# Patient Record
Sex: MALE | Race: WHITE
[De-identification: names, ages, dates, MRNs, and addresses within clinical notes are randomized per-mention and may not be internally consistent; named-entity substitution may affect disease eponyms.]

---

## 2017-04-16 ENCOUNTER — HOSPITAL ENCOUNTER (EMERGENCY)
Dept: HOSPITAL 17 - NEPD | Age: 37
LOS: 1 days | Discharge: PSYCHIATRIC OR SUBSTANCE USE FACILITY | End: 2017-04-17
Payer: MEDICAID

## 2017-04-16 VITALS
TEMPERATURE: 97.5 F | OXYGEN SATURATION: 97 % | SYSTOLIC BLOOD PRESSURE: 125 MMHG | DIASTOLIC BLOOD PRESSURE: 84 MMHG | RESPIRATION RATE: 16 BRPM | HEART RATE: 108 BPM

## 2017-04-16 VITALS — WEIGHT: 198.42 LBS | BODY MASS INDEX: 30.07 KG/M2 | HEIGHT: 68 IN

## 2017-04-16 DIAGNOSIS — R45.851: ICD-10-CM
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
## 2017-04-16 DIAGNOSIS — L03.116: Primary | ICD-10-CM

## 2017-04-16 DIAGNOSIS — Z72.0: ICD-10-CM

## 2017-04-16 PROCEDURE — 85025 COMPLETE CBC W/AUTO DIFF WBC: CPT

## 2017-04-16 PROCEDURE — 80307 DRUG TEST PRSMV CHEM ANLYZR: CPT

## 2017-04-16 PROCEDURE — 99284 EMERGENCY DEPT VISIT MOD MDM: CPT

## 2017-04-16 PROCEDURE — 73610 X-RAY EXAM OF ANKLE: CPT

## 2017-04-16 PROCEDURE — 80053 COMPREHEN METABOLIC PANEL: CPT

## 2017-04-16 NOTE — PD
HPI


Chief Complaint:  Back/ Neck Pain or Injury


Time Seen by Provider:  23:48


Travel History


International Travel<30 days:  No


Contact w/Intl Traveler<30days:  No


Traveled to known affect area:  No





History of Present Illness


HPI


36-year-old male history of seizure disorder presents for evaluation of lateral 

left ankle pain.  He reports that a few weeks ago he had a breakthrough seizure 

and hit his lateral left ankle against the carpet.  He is developed some 

redness of the skin as well as some pain to the lateral left ankle.  Pain is 

mild and aggravated by movement and palpation.  He also endorses chronic lower 

back pain.  Denies any acute process in regards to his chronic lower back pain.

  Currently uses over-the-counter Tylenol and Motrin for pain control.  Denies 

any bowel or bladder incontinence, saddle anesthesia, abdominal pain, flank pain

, dysuria.  No other complaints.





PFSH


Past Medical History


Arthritis:  Yes


Asthma:  No


Autoimmune Disease:  No


Blood Disorders:  No


Bipolar Disorder:  Yes


Anxiety:  Yes


Depression:  Yes


Heart Rhythm Problems:  No


Cancer:  No


Cardiovascular Problems:  Yes


High Cholesterol:  Yes


Chemotherapy:  No


Chest Pain:  No


Congestive Heart Failure:  No


COPD:  No


Cerebrovascular Accident:  No


Cystic Fibrosis:  Yes


Diabetes:  Yes


Diminished Hearing:  No


Endocrine:  Yes


GERD:  Yes


Genitourinary:  Yes


Headaches:  Yes


Hepatitis:  Yes (C)


Hiatal Hernia:  No


Hypertension:  Yes


Immune Disorder:  No


Implanted Vascular Access Dvce:  No


Kidney Stones:  No


Musculoskeletal:  Yes


Neurologic:  Yes


Psychiatric:  Yes


Reproductive:  No


Respiratory:  No


Immunizations Current:  Yes


Migraines:  Yes


Radiation Therapy:  No


Renal Failure:  Yes


Schizophrenia:  Yes


Seizures:  Yes


Sickle Cell Disease:  No


Sleep Apnea:  Yes


Thyroid Disease:  No


Ulcer:  No





Past Surgical History


Abdominal Surgery:  No


AICD:  No


Arteriovenous Shunt:  No


Cardiac Surgery:  No


Ear Surgery:  No


Endocrine Surgery:  No


Eye Surgery:  No


Genitourinary Surgery:  No


Gynecologic Surgery:  No


Insulin Pump:  No


Joint Replacement:  No


Oral Surgery:  Yes (wisdom teeth extraction)


Pacemaker:  No


Thoracic Surgery:  No


Other Surgery:  Yes (oral sx)





Social History


Alcohol Use:  Yes (occasionally)


Tobacco Use:  Yes (10 CIGARETTES)


Substance Use:  Yes ([facility])





Allergies-Medications


(Allergen,Severity, Reaction):  


Coded Allergies:  


     Haldol (Verified  Allergy, Severe, JAWS LOCK, 4/16/17)


     Penicillin (Verified  Allergy, Severe, 4/16/17)


Reported Meds & Prescriptions





Reported Meds & Active Scripts


Active


Clindamycin (Clindamycin HCl) 300 Mg Cap 300 Mg PO TID 7 Days


Lisinopril 20 Mg Tab 20 Mg PO DAILY


Keppra (Levetiracetam) 1,000 Mg Tab 1,000 Mg PO BID


Heartburn Relief 150 Maxi (Ranitidine HCl) 150 Mg Tab 150 Mg PO DAILY


Reported


Trazodone (Trazodone HCl) 300 Mg Tab 400 Mg PO HS


Seroquel (Quetiapine Fumarate) 400 Mg Tab 400 Mg PO HS


Prozac (Fluoxetine HCl) 40 Mg Cap 80 Cap PO DAILY








Review of Systems


Except as stated in HPI:  all other systems reviewed are Neg





Physical Exam


Narrative


GENERAL: Well-nourished male in no acute distress


SKIN: Warm and dry.  Mild redness to the lateral left ankle skin noted.


HEAD: Atraumatic. Normocephalic. 


EYES: Pupils equal and round. No scleral icterus. No injection or drainage. 


ENT: No nasal bleeding or discharge.  Mucous membranes pink and moist.


NECK: Trachea midline. No JVD. 


CARDIOVASCULAR: Regular rate and rhythm.  No murmur appreciated.


RESPIRATORY: No accessory muscle use. Clear to auscultation. Breath sounds 

equal bilaterally. 


GASTROINTESTINAL: Abdomen soft, non-tender, nondistended. Hepatic and splenic 

margins not palpable. 


MUSCULOSKELETAL: No obvious deformities.  Mild tenderness to palpation to the 

lateral left ankle.  No tenderness to palpation to the midline spine or CVA 

tenderness.


NEUROLOGICAL: Awake and alert. No obvious cranial nerve deficits.  Motor 

grossly within normal limits. Normal speech.





Data


Data


Last Documented VS





Vital Signs








  Date Time  Temp Pulse Resp B/P Pulse Ox O2 Delivery O2 Flow Rate FiO2


 


4/16/17 21:49 97.5 108 16 125/84 97 Room Air  








Orders





 Ankle, Complete ([id number]) (4/16/17 )


Complete Blood Count With Diff (4/17/17 00:21)


Comprehensive Metabolic Panel (4/17/17 00:21)


Psych Screen (4/17/17 00:21)


Drug Screen, Random Urine (4/17/17 00:21)


Alcohol (Ethanol) (4/17/17 00:21)


Levetiracetam (Keppra) (4/17/17 02:15)





Labs





 Laboratory Tests








Test 4/17/17





 00:45


 


White Blood Count 8.7 TH/MM3


 


Red Blood Count 3.88 MIL/MM3


 


Hemoglobin 12.2 GM/DL


 


Hematocrit 34.9 %


 


Mean Corpuscular Volume 89.9 FL


 


Mean Corpuscular Hemoglobin 31.5 PG


 


Mean Corpuscular Hemoglobin 35.1 %





Concent 


 


Red Cell Distribution Width 13.6 %


 


Platelet Count 173 TH/MM3


 


Mean Platelet Volume 9.7 FL


 


Neutrophils (%) (Auto) 55.1 %


 


Lymphocytes (%) (Auto) 27.9 %


 


Monocytes (%) (Auto) 12.3 %


 


Eosinophils (%) (Auto) 3.9 %


 


Basophils (%) (Auto) 0.8 %


 


Neutrophils # (Auto) 4.8 TH/MM3


 


Lymphocytes # (Auto) 2.4 TH/MM3


 


Monocytes # (Auto) 1.1 TH/MM3


 


Eosinophils # (Auto) 0.3 TH/MM3


 


Basophils # (Auto) 0.1 TH/MM3


 


CBC Comment DIFF FINAL 


 


Differential Comment  


 


Sodium Level 135 MEQ/L


 


Potassium Level 3.5 MEQ/L


 


Chloride Level 99 MEQ/L


 


Carbon Dioxide Level 29.0 MEQ/L


 


Anion Gap 7 MEQ/L


 


Blood Urea Nitrogen 3 MG/DL


 


Creatinine 0.83 MG/DL


 


Estimat Glomerular Filtration 104 ML/MIN





Rate 


 


Random Glucose 147 MG/DL


 


Calcium Level 9.0 MG/DL


 


Total Bilirubin 0.5 MG/DL


 


Aspartate Amino Transf 25 U/L





(AST/SGOT) 


 


Alanine Aminotransferase 32 U/L





(ALT/SGPT) 


 


Alkaline Phosphatase 52 U/L


 


Total Protein 6.9 GM/DL


 


Albumin 3.4 GM/DL


 


Urine Opiates Screen NEG 


 


Urine Barbiturates Screen NEG 


 


Urine Amphetamines Screen NEG 


 


Urine Benzodiazepines Screen NEG 


 


Urine Cocaine Screen NEG 


 


Urine Cannabinoids Screen POS 


 


Ethyl Alcohol Level LESS THAN 3





 MG/DL











MDM


Medical Decision Making


Medical Screen Exam Complete:  Yes


Emergency Medical Condition:  Yes


Medical Record Reviewed:  Yes


Differential Diagnosis


Ankle contusion, fibular fracture, lateral ankle sprain, cellulitis


Narrative Course


X-ray imaging of the left ankle is negative.  Upon reexamination the patient 

states that he has been feeling suicidal homicidal and revengeful recently.  He 

is requesting a psychiatric evaluation.


Mental health screening discussed with the patient. Psychiatric screen ordered.


The patient is medically cleared.  He was screen psychiatrically.  He was 

placed under Baker act because of his irrational behavior and irrational 

thought processes.  He is being given a dose of Keppra because he allegedly has 

been out of his Keppra for several days.  He is being given prescriptions for 

Keppra and clindamycin.  He is being transferred to [facility] for 

psychiatric evaluation.





Diagnosis





 Primary Impression:  


 Cellulitis of left ankle


 Additional Impression:  


 Suicidal ideation


Scripts


Levetiracetam (Keppra)1,000 Mg Tab1,000 Mg PO BID  30 Days  Ref 0


   Prov:Trina Narayan MD         4/17/17 


Clindamycin 300 Mg Yoj775 Mg PO TID  7 Days  Ref 0


   Prov:Trina Narayan MD         4/17/17








Jamal Somers Apr 16, 2017 23:59

## 2017-04-17 LAB
ALP SERPL-CCNC: 52 U/L (ref 45–117)
ALT SERPL-CCNC: 32 U/L (ref 12–78)
AMPHETAMINE, URINE: (no result)
ANION GAP SERPL CALC-SCNC: 7 MEQ/L (ref 5–15)
AST SERPL-CCNC: 25 U/L (ref 15–37)
BARBITURATES, URINE: (no result)
BASOPHILS # BLD AUTO: 0.1 TH/MM3 (ref 0–0.2)
BASOPHILS NFR BLD: 0.8 % (ref 0–2)
BILIRUB SERPL-MCNC: 0.5 MG/DL (ref 0.2–1)
BUN SERPL-MCNC: 3 MG/DL (ref 7–18)
CHLORIDE SERPL-SCNC: 99 MEQ/L (ref 98–107)
COCAINE UR-MCNC: (no result) NG/ML
EOSINOPHIL # BLD: 0.3 TH/MM3 (ref 0–0.4)
EOSINOPHIL NFR BLD: 3.9 % (ref 0–4)
ERYTHROCYTE [DISTWIDTH] IN BLOOD BY AUTOMATED COUNT: 13.6 % (ref 11.6–17.2)
GFR SERPLBLD BASED ON 1.73 SQ M-ARVRAT: 104 ML/MIN (ref 89–?)
HCO3 BLD-SCNC: 29 MEQ/L (ref 21–32)
HCT VFR BLD CALC: 34.9 % (ref 39–51)
HEMO FLAGS: (no result)
LYMPHOCYTES # BLD AUTO: 2.4 TH/MM3 (ref 1–4.8)
LYMPHOCYTES NFR BLD AUTO: 27.9 % (ref 9–44)
MCH RBC QN AUTO: 31.5 PG (ref 27–34)
MCHC RBC AUTO-ENTMCNC: 35.1 % (ref 32–36)
MCV RBC AUTO: 89.9 FL (ref 80–100)
MONOCYTES NFR BLD: 12.3 % (ref 0–8)
NEUTROPHILS # BLD AUTO: 4.8 TH/MM3 (ref 1.8–7.7)
NEUTROPHILS NFR BLD AUTO: 55.1 % (ref 16–70)
PLATELET # BLD: 173 TH/MM3 (ref 150–450)
POTASSIUM SERPL-SCNC: 3.5 MEQ/L (ref 3.5–5.1)
RBC # BLD AUTO: 3.88 MIL/MM3 (ref 4.5–5.9)
SODIUM SERPL-SCNC: 135 MEQ/L (ref 136–145)
WBC # BLD AUTO: 8.7 TH/MM3 (ref 4–11)

## 2017-04-17 NOTE — RADRPT
EXAM DATE/TIME:  04/17/2017 00:20 

 

[facility] COMPARISON:     

No previous studies available for comparison.

 

                     

INDICATIONS :     

Left lateral ankle pain, fall off bed from seizure 1 week ago.

                     

 

MEDICAL HISTORY :     

None.          

 

SURGICAL HISTORY :     

None.   

 

ENCOUNTER:     

Initial                                        

 

ACUITY:     

1 day      

 

PAIN SCORE:     

4/10

 

LOCATION:     

Left lateral ankle.

 

FINDINGS:     

Three view exam was performed of the left ankle.  The bony structures are in normal alignment.  No ev
idence of fracture, dislocation, or soft tissue swelling.  The ankle mortise is intact.  No radiopaqu
e foreign bodies are seen.  Bony mineralization is normal.

 

CONCLUSION:     

Unremarkable examination of the left ankle.  

 

 

 

 Luis Wilkins MD on April 17, 2017 at 0:35           

Board Certified Radiologist.

 This report was verified electronically.

## 2017-08-15 ENCOUNTER — HOSPITAL ENCOUNTER (EMERGENCY)
Dept: HOSPITAL 17 - NEPC | Age: 37
Discharge: HOME | End: 2017-08-15
Payer: MEDICAID

## 2017-08-15 VITALS — HEIGHT: 69 IN | WEIGHT: 216.05 LBS | BODY MASS INDEX: 32 KG/M2

## 2017-08-15 VITALS
HEART RATE: 115 BPM | SYSTOLIC BLOOD PRESSURE: 132 MMHG | OXYGEN SATURATION: 95 % | RESPIRATION RATE: 18 BRPM | DIASTOLIC BLOOD PRESSURE: 84 MMHG

## 2017-08-15 VITALS
RESPIRATION RATE: 20 BRPM | OXYGEN SATURATION: 97 % | TEMPERATURE: 98.3 F | SYSTOLIC BLOOD PRESSURE: 135 MMHG | HEART RATE: 89 BPM | DIASTOLIC BLOOD PRESSURE: 86 MMHG

## 2017-08-15 DIAGNOSIS — K21.9: ICD-10-CM

## 2017-08-15 DIAGNOSIS — G47.33: ICD-10-CM

## 2017-08-15 DIAGNOSIS — R56.9: Primary | ICD-10-CM

## 2017-08-15 DIAGNOSIS — I10: ICD-10-CM

## 2017-08-15 LAB
ALP SERPL-CCNC: 78 U/L (ref 45–117)
ALT SERPL-CCNC: 78 U/L (ref 12–78)
ANION GAP SERPL CALC-SCNC: 4 MEQ/L (ref 5–15)
AST SERPL-CCNC: 59 U/L (ref 15–37)
BASOPHILS # BLD AUTO: 0.1 TH/MM3 (ref 0–0.2)
BASOPHILS NFR BLD: 0.6 % (ref 0–2)
BILIRUB SERPL-MCNC: 0.5 MG/DL (ref 0.2–1)
BUN SERPL-MCNC: 8 MG/DL (ref 7–18)
CHLORIDE SERPL-SCNC: 93 MEQ/L (ref 98–107)
EOSINOPHIL # BLD: 0.1 TH/MM3 (ref 0–0.4)
EOSINOPHIL NFR BLD: 1.2 % (ref 0–4)
ERYTHROCYTE [DISTWIDTH] IN BLOOD BY AUTOMATED COUNT: 13.2 % (ref 11.6–17.2)
GFR SERPLBLD BASED ON 1.73 SQ M-ARVRAT: 76 ML/MIN (ref 89–?)
HCO3 BLD-SCNC: 31.2 MEQ/L (ref 21–32)
HCT VFR BLD CALC: 46.2 % (ref 39–51)
HEMO FLAGS: (no result)
LYMPHOCYTES # BLD AUTO: 2.1 TH/MM3 (ref 1–4.8)
LYMPHOCYTES NFR BLD AUTO: 22 % (ref 9–44)
MCH RBC QN AUTO: 29.8 PG (ref 27–34)
MCHC RBC AUTO-ENTMCNC: 33.5 % (ref 32–36)
MCV RBC AUTO: 88.9 FL (ref 80–100)
MONOCYTES NFR BLD: 8.9 % (ref 0–8)
NEUTROPHILS # BLD AUTO: 6.6 TH/MM3 (ref 1.8–7.7)
NEUTROPHILS NFR BLD AUTO: 67.3 % (ref 16–70)
PLATELET # BLD: 229 TH/MM3 (ref 150–450)
POTASSIUM SERPL-SCNC: 4.6 MEQ/L (ref 3.5–5.1)
RBC # BLD AUTO: 5.19 MIL/MM3 (ref 4.5–5.9)
SODIUM SERPL-SCNC: 128 MEQ/L (ref 136–145)
WBC # BLD AUTO: 9.8 TH/MM3 (ref 4–11)

## 2017-08-15 PROCEDURE — 85025 COMPLETE CBC W/AUTO DIFF WBC: CPT

## 2017-08-15 PROCEDURE — 80053 COMPREHEN METABOLIC PANEL: CPT

## 2017-08-15 PROCEDURE — 93005 ELECTROCARDIOGRAM TRACING: CPT

## 2017-08-15 PROCEDURE — 96360 HYDRATION IV INFUSION INIT: CPT

## 2017-08-15 PROCEDURE — 99284 EMERGENCY DEPT VISIT MOD MDM: CPT

## 2017-08-15 NOTE — PD
Data


Data


Last Documented VS





Vital Signs








  Date Time  Temp Pulse Resp B/P Pulse Ox O2 Delivery O2 Flow Rate FiO2


 


8/15/17 11:29 98.3 86 20 135/86 97 Room Air  








Orders





 Complete Blood Count With Diff (8/15/17 11:18)


Electrocardiogram (8/15/17 )


Blood Glucose (8/15/17 11:18)


Ecg Monitoring (8/15/17 11:18)


Iv Access Insert/Monitor (8/15/17 11:18)


Oximetry (8/15/17 11:18)


Comprehensive Metabolic Panel (8/15/17 11:18)


Sodium Chloride 0.9% Flush (Ns Flush) (8/15/17 11:30)


Acetaminophen (Tylenol) (8/15/17 11:30)


Sodium Chlor 0.9% 1000 Ml Inj (Ns 1000 M (8/15/17 12:00)





Labs





 Laboratory Tests








Test 8/15/17





 11:30


 


White Blood Count 9.8 TH/MM3


 


Red Blood Count 5.19 MIL/MM3


 


Hemoglobin 15.5 GM/DL


 


Hematocrit 46.2 %


 


Mean Corpuscular Volume 88.9 FL


 


Mean Corpuscular Hemoglobin 29.8 PG


 


Mean Corpuscular Hemoglobin 33.5 %





Concent 


 


Red Cell Distribution Width 13.2 %


 


Platelet Count 229 TH/MM3


 


Mean Platelet Volume 9.4 FL


 


Neutrophils (%) (Auto) 67.3 %


 


Lymphocytes (%) (Auto) 22.0 %


 


Monocytes (%) (Auto) 8.9 %


 


Eosinophils (%) (Auto) 1.2 %


 


Basophils (%) (Auto) 0.6 %


 


Neutrophils # (Auto) 6.6 TH/MM3


 


Lymphocytes # (Auto) 2.1 TH/MM3


 


Monocytes # (Auto) 0.9 TH/MM3


 


Eosinophils # (Auto) 0.1 TH/MM3


 


Basophils # (Auto) 0.1 TH/MM3


 


CBC Comment DIFF FINAL 


 


Differential Comment  


 


Sodium Level 128 MEQ/L


 


Potassium Level 4.6 MEQ/L


 


Chloride Level 93 MEQ/L


 


Carbon Dioxide Level 31.2 MEQ/L


 


Anion Gap 4 MEQ/L


 


Blood Urea Nitrogen 8 MG/DL


 


Creatinine 1.09 MG/DL


 


Estimat Glomerular Filtration 76 ML/MIN





Rate 


 


Random Glucose 177 MG/DL


 


Calcium Level 9.8 MG/DL


 


Total Bilirubin 0.5 MG/DL


 


Aspartate Amino Transf 59 U/L





(AST/SGOT) 


 


Alanine Aminotransferase 78 U/L





(ALT/SGPT) 


 


Alkaline Phosphatase 78 U/L


 


Total Protein 8.4 GM/DL


 


Albumin 4.1 GM/DL











MDM


Supervised Visit with HERBIE:  Yes


Narrative Course


The history, exam, and medical decision-making in the associated mid-level 

provider note were completed with my assistance. I reviewed and agree with the 

findings presented.  I attest that I had a face-to-face encounter with the 

patient on the same day, and personally performed and documented my assessment 

and findings in the medical record. 





*My assessment and Findings:





36-year-old man with a seizure, history of seizures, stopped his Breath the 

direction of the psychiatrist.  Unclear why.  He states some lab by [facility] 

normal.  Nonetheless looks well now.  Labs unremarkable.  Recommend outpatient 

follow-up.


Diagnosis





 Primary Impression:  


 Breakthrough seizure


Referrals:  


Primary Care Physician


2 days


Patient Instructions:  General Instructions, Recurrent Seizures in Adults (ED)


Departure Forms:  Tests/Procedures





***Additional Instruction:


Follow-up with your primary care physician.


Return to the emergency department for any acute worsening of symptoms.


Disposition:  01 DISCHARGE HOME


Condition:  Stable








Luis Kamara MD Aug 15, 2017 13:05

## 2017-08-15 NOTE — PD
HPI


Chief Complaint:  Seizure


Time Seen by Provider:  11:20


Travel History


International Travel<30 days:  No


Contact w/Intl Traveler<30days:  No


Traveled to known affect area:  No





History of Present Illness


HPI


36 year old male presents to the emergency department for evaluation after a 

seizure that occurred this morning around 8am.  He states he woke up and 

immediately seized.  Patient reports history of seizures.  He was previously on 

Keppra, but was taken off his Keppra by his psychiatrist due to a lab 

abnormality on Thursday, August 10.  He states that his Right-sided control his 

seizures quite well.  His grandmother who he lives with his at bedside.  

Patient reports migraine headaches and intermittent abdominal and back 

cramping.  He states that he typically gets these after a seizure.  He denies 

any abdominal pain at this time.  Patient states he was laying in bed when the 

seizure occurred.  Therefore, he did not fall or hit his head.  He denies any 

vomiting, but reports nausea.  No diarrhea or constipation.  He denies any 

chest pain or shortness of breath.  Patient also reports history of diabetes, 

hypertension, bipolar disorder, schizophrenia.  Patient states he bit his tongue

, but denies any incontinence.





PFSH


Past Medical History


Arthritis:  Yes


Asthma:  No


Autoimmune Disease:  No


Blood Disorders:  No


Bipolar Disorder:  Yes


Anxiety:  Yes


Depression:  Yes


Heart Rhythm Problems:  No


Cancer:  No


Cardiovascular Problems:  Yes


High Cholesterol:  Yes


Chemotherapy:  No


Chest Pain:  No


Congestive Heart Failure:  No


COPD:  No


Cerebrovascular Accident:  No


Cystic Fibrosis:  Yes


Diabetes:  Yes


Patient Takes Glucophage:  No


Diminished Hearing:  No


Endocrine:  Yes


GERD:  Yes


Genitourinary:  Yes


Headaches:  Yes


Hepatitis:  Yes (C)


Hiatal Hernia:  No


Hypertension:  Yes


Immune Disorder:  No


Implanted Vascular Access Dvce:  No


Kidney Stones:  No


Musculoskeletal:  Yes


Neurologic:  Yes


Psychiatric:  Yes


Reproductive:  No


Respiratory:  No


Immunizations Current:  Yes


Migraines:  Yes


Radiation Therapy:  No


Renal Failure:  Yes


Schizophrenia:  Yes


Seizures:  Yes


Sickle Cell Disease:  No


Sleep Apnea:  Yes


Thyroid Disease:  No


Ulcer:  No





Past Surgical History


Abdominal Surgery:  No


AICD:  No


Arteriovenous Shunt:  No


Cardiac Surgery:  No


Ear Surgery:  No


Endocrine Surgery:  No


Eye Surgery:  No


Genitourinary Surgery:  No


Gynecologic Surgery:  No


Insulin Pump:  No


Joint Replacement:  No


Oral Surgery:  Yes (wisdom teeth extraction)


Pacemaker:  No


Thoracic Surgery:  No


Other Surgery:  Yes (oral sx)





Social History


Alcohol Use:  Yes (occasionally)


Tobacco Use:  Yes (HALF PACK A DAY )


Substance Use:  Yes





Allergies-Medications


(Allergen,Severity, Reaction):  


Coded Allergies:  


     haloperidol (Unverified  Allergy, Severe, JAWS LOCK, 8/15/17)


     penicillin G (Unverified  Allergy, Severe, 8/15/17)


Reported Meds & Prescriptions





Reported Meds & Active Scripts


Active


Lisinopril 20 Mg Tab 20 Mg PO DAILY


Calcipotriene Topical (Calcipotriene) 0.005% Oint 1 Applic TOPICAL BID


Betamethasone Dipropionate Aug Topical 0.05% Cream 1 Applic TOPICAL BID


Heartburn Relief 150 Maxi (Ranitidine HCl) 150 Mg Tab 150 Mg PO DAILY


Reported


Prozac (Fluoxetine HCl) 40 Mg Cap 60 Mg PO DAILY








Review of Systems


Except as stated in HPI:  all other systems reviewed are Neg





Physical Exam


Narrative


GENERAL: Well-nourished, well-developed male patient, afebrile.


SKIN: Focused skin assessment warm/dry.  No obvious lacerations or abrasions to 

the tongue on exam.


HEAD: Normocephalic.  Atraumatic.


ENT: Mucosa pink and moist. No erythema or exudates. No uvular edema. No uvular

, palatal, or tonsillar deviation. Airway patent. Nasal turbinates appear 

normal without nasal blood, purulent drainage or septal hematoma.  Bilateral 

tympanic membranes are clear without erythema or perforation.


EYES: No scleral icterus. No injection or drainage. 


NECK: Supple, trachea midline. No JVD or lymphadenopathy.


CARDIOVASCULAR: Regular rate and rhythm without murmurs, gallops, or rubs. 


RESPIRATORY: Breath sounds equal bilaterally. No accessory muscle use.  Lungs 

sounds are clear to auscultation.


GASTROINTESTINAL: Abdomen soft, non-tender, nondistended.  No abdominal pain to 

palpation.


MUSCULOSKELETAL: No cyanosis, or edema. 


BACK: Nontender without obvious deformity. No CVA tenderness.





Data


Data


Last Documented VS





Vital Signs








  Date Time  Temp Pulse Resp B/P Pulse Ox O2 Delivery O2 Flow Rate FiO2


 


8/15/17 11:29 98.3 86 20 135/86 97 Room Air  








Orders





 Complete Blood Count With Diff (8/15/17 11:18)


Electrocardiogram (8/15/17 )


Blood Glucose (8/15/17 11:18)


Ecg Monitoring (8/15/17 11:18)


Iv Access Insert/Monitor (8/15/17 11:18)


Oximetry (8/15/17 11:18)


Comprehensive Metabolic Panel (8/15/17 11:18)


Sodium Chloride 0.9% Flush (Ns Flush) (8/15/17 11:30)


Acetaminophen (Tylenol) (8/15/17 11:30)


Sodium Chlor 0.9% 1000 Ml Inj (Ns 1000 M (8/15/17 12:00)





Labs





 Laboratory Tests








Test 8/15/17





 11:30


 


White Blood Count 9.8 TH/MM3


 


Red Blood Count 5.19 MIL/MM3


 


Hemoglobin 15.5 GM/DL


 


Hematocrit 46.2 %


 


Mean Corpuscular Volume 88.9 FL


 


Mean Corpuscular Hemoglobin 29.8 PG


 


Mean Corpuscular Hemoglobin 33.5 %





Concent 


 


Red Cell Distribution Width 13.2 %


 


Platelet Count 229 TH/MM3


 


Mean Platelet Volume 9.4 FL


 


Neutrophils (%) (Auto) 67.3 %


 


Lymphocytes (%) (Auto) 22.0 %


 


Monocytes (%) (Auto) 8.9 %


 


Eosinophils (%) (Auto) 1.2 %


 


Basophils (%) (Auto) 0.6 %


 


Neutrophils # (Auto) 6.6 TH/MM3


 


Lymphocytes # (Auto) 2.1 TH/MM3


 


Monocytes # (Auto) 0.9 TH/MM3


 


Eosinophils # (Auto) 0.1 TH/MM3


 


Basophils # (Auto) 0.1 TH/MM3


 


CBC Comment DIFF FINAL 


 


Differential Comment  


 


Sodium Level 128 MEQ/L


 


Potassium Level 4.6 MEQ/L


 


Chloride Level 93 MEQ/L


 


Carbon Dioxide Level 31.2 MEQ/L


 


Anion Gap 4 MEQ/L


 


Blood Urea Nitrogen 8 MG/DL


 


Creatinine 1.09 MG/DL


 


Estimat Glomerular Filtration 76 ML/MIN





Rate 


 


Random Glucose 177 MG/DL


 


Calcium Level 9.8 MG/DL


 


Total Bilirubin 0.5 MG/DL


 


Aspartate Amino Transf 59 U/L





(AST/SGOT) 


 


Alanine Aminotransferase 78 U/L





(ALT/SGPT) 


 


Alkaline Phosphatase 78 U/L


 


Total Protein 8.4 GM/DL


 


Albumin 4.1 GM/DL











[facility]


Medical Decision Making


Medical Screen Exam Complete:  Yes


Emergency Medical Condition:  Yes


Medical Record Reviewed:  Yes


Differential Diagnosis


Breakthrough seizure versus recurrent seizure versus medication noncompliance 

versus electrolyte abnormality


Narrative Course


36-year-old male presents to the emergency department for evaluation after a 

seizure this morning.  He has history of seizures and was recently taken off of 

his Keppra.  Patient is back to his regular mental status.  He does report a 

migraine headache and cramping, which she states is normal for him after a 

seizure.





EKG shows sinus rhythm, heart rate 82, early repolarization.  This was 

unchanged since previous EKG in December.  This was read and interpreted by my 

attending physician, Dr. Kamara.  CBC, CMP are ordered and pending.  Patient is 

given Tylenol 650 mg by mouth for headache.





CBC shows no acute abnormality.  CMP shows hyponatremia 128, glucose 177.





I discussed the findings with my attending physician, Dr. Kamara, who agrees on 

plan and disposition.





The patient was discharged in stable condition with instructions, including 

return instructions and follow up instructions.





Diagnosis





 Primary Impression:  


 Breakthrough seizure


Referrals:  


Primary Care Physician


2 days


Patient Instructions:  General Instructions, Recurrent Seizures in Adults (ED)





***Additional Instructions:


Follow-up with your primary care physician.


Return to the emergency department for any acute worsening of symptoms.


***Med/Other Pt SpecificInfo:  No Change to Meds


Disposition:  01 DISCHARGE HOME


Condition:  Stable








Clau Rm Aug 15, 2017 11:20

## 2017-08-15 NOTE — EKG
Date Performed: 08/15/2017       Time Performed: 11:40:33

 

PTAGE:      36 years

 

EKG:      Sinus rhythm 

 

 BORDERLINE RIGHT AXIS DEVIATION SEPTAL MYOCARDIAL INFARCTION,Possibly recent. Compared to prior trac
ing no significant change 

 

 PREVIOUS TRACING            : 12/30/2016 09.23

 

DOCTOR:   Cricket Ruiz  Interpretating Date/Time  08/15/2017 13:29:25

## 2018-05-21 ENCOUNTER — HOSPITAL ENCOUNTER (EMERGENCY)
Dept: HOSPITAL 17 - NEPJ | Age: 38
LOS: 1 days | Discharge: HOME | End: 2018-05-22
Payer: MEDICAID

## 2018-05-21 VITALS — HEIGHT: 68 IN | WEIGHT: 231.49 LBS | BODY MASS INDEX: 35.08 KG/M2

## 2018-05-21 DIAGNOSIS — E11.9: ICD-10-CM

## 2018-05-21 DIAGNOSIS — F43.25: Primary | ICD-10-CM

## 2018-05-21 DIAGNOSIS — I10: ICD-10-CM

## 2018-05-21 DIAGNOSIS — Z79.84: ICD-10-CM

## 2018-05-21 DIAGNOSIS — F17.200: ICD-10-CM

## 2018-05-21 DIAGNOSIS — L02.411: ICD-10-CM

## 2018-05-21 PROCEDURE — 99284 EMERGENCY DEPT VISIT MOD MDM: CPT

## 2018-05-22 VITALS
OXYGEN SATURATION: 99 % | RESPIRATION RATE: 19 BRPM | HEART RATE: 81 BPM | DIASTOLIC BLOOD PRESSURE: 92 MMHG | SYSTOLIC BLOOD PRESSURE: 136 MMHG

## 2018-05-22 NOTE — PD
Physical Exam


Date Seen by Provider:  May 22, 2018


Time Seen by Provider:  09:48


Narrative


37-year-old male previously Sourav acted by [facility] and transferred here 

for psychiatric evaluation, was medically cleared for psychiatric evaluation.  

Patient was seen by psychiatric staff and deemed psychiatrically stable for 

discharge at this time.  Patient will be continued on Bactrim DS twice daily 

for 7 days for cellulitis of the right axilla.  Psychiatric follow-up will be 

based on psychiatric note.





Data


Data


Last Documented VS





Vital Signs








  Date Time  Temp Pulse Resp B/P (MAP) Pulse Ox O2 Delivery O2 Flow Rate FiO2


 


5/22/18 04:50  81 19 136/92 (107) 99 Room Air  








Orders





 Orders


Sulfamet-Trimeth Ds 800-160 Mg (Bactrim (5/21/18 23:30)


Psych Screen (5/21/18 23:43)


Diet Diabetic (5/22/18 Breakfast)


Levetiracetam (Keppra) (5/22/18 08:15)


Metformin (Glucophage) (5/22/18 08:15)


Glipizide (Glucotrol) (5/22/18 08:15)


Lisinopril (Prinivil) (5/22/18 08:15)


Famotidine (Pepcid) (5/22/18 08:15)


Lorazepam (Ativan) (5/22/18 08:15)


Sulfamet-Trimeth Ds 800-160 Mg (Bactrim (5/22/18 08:15)








MDM


Medical Record Reviewed:  Yes


Supervised Visit with HERBIE:  Yes


Narrative Course


37-year-old male previously Sourav acted by [facility] and transferred here 

for psychiatric evaluation, was medically cleared for psychiatric evaluation.  

Patient was seen by psychiatric staff and deemed psychiatrically stable for 

discharge at this time.  Patient will be continued on Bactrim DS twice daily 

for 7 days for cellulitis of the right axilla.  Psychiatric follow-up will be 

based on psychiatric note.


Diagnosis





 Primary Impression:  


 Adjustment disorder with disturbance of conduct


 Additional Impressions:  


 Medical clearance for psychiatric admission


 Right axilla abscess


Patient Instructions:  General Instructions


Departure Forms:  Tests/Procedures


Scripts


Sulfamethoxazole-Trimethoprim (Bactrim DS) 800-160 Mg Tab


1 TAB PO BID for Infection, #20 TAB 0 Refills


   Prov: Connor Conroy         5/22/18


Disposition:  01 DISCHARGE HOME


Condition:  Stable











Mitchell Conroyw F. PA May 22, 2018 09:50
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
## 2018-06-17 ENCOUNTER — HOSPITAL ENCOUNTER (EMERGENCY)
Dept: HOSPITAL 17 - NEPC | Age: 38
Discharge: HOME | End: 2018-06-17
Payer: MEDICAID

## 2018-06-17 VITALS
TEMPERATURE: 98.2 F | DIASTOLIC BLOOD PRESSURE: 86 MMHG | HEART RATE: 114 BPM | SYSTOLIC BLOOD PRESSURE: 147 MMHG | OXYGEN SATURATION: 100 % | RESPIRATION RATE: 22 BRPM

## 2018-06-17 VITALS — TEMPERATURE: 98.2 F | DIASTOLIC BLOOD PRESSURE: 77 MMHG | SYSTOLIC BLOOD PRESSURE: 139 MMHG

## 2018-06-17 VITALS — HEIGHT: 68 IN | BODY MASS INDEX: 35.08 KG/M2 | WEIGHT: 231.49 LBS

## 2018-06-17 VITALS — OXYGEN SATURATION: 96 %

## 2018-06-17 DIAGNOSIS — Z79.899: ICD-10-CM

## 2018-06-17 DIAGNOSIS — F20.9: ICD-10-CM

## 2018-06-17 DIAGNOSIS — E78.00: ICD-10-CM

## 2018-06-17 DIAGNOSIS — R51: ICD-10-CM

## 2018-06-17 DIAGNOSIS — S00.83XA: ICD-10-CM

## 2018-06-17 DIAGNOSIS — E11.9: ICD-10-CM
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
## 2018-06-17 DIAGNOSIS — Z72.0: ICD-10-CM

## 2018-06-17 DIAGNOSIS — F41.9: ICD-10-CM

## 2018-06-17 DIAGNOSIS — M19.90: ICD-10-CM

## 2018-06-17 DIAGNOSIS — K21.9: ICD-10-CM

## 2018-06-17 DIAGNOSIS — G40.909: Primary | ICD-10-CM

## 2018-06-17 DIAGNOSIS — F31.9: ICD-10-CM

## 2018-06-17 DIAGNOSIS — E84.9: ICD-10-CM

## 2018-06-17 DIAGNOSIS — G47.00: ICD-10-CM

## 2018-06-17 DIAGNOSIS — I10: ICD-10-CM

## 2018-06-17 DIAGNOSIS — S01.552A: ICD-10-CM

## 2018-06-17 LAB
ALBUMIN SERPL-MCNC: 3.5 GM/DL (ref 3.4–5)
ALP SERPL-CCNC: 76 U/L (ref 45–117)
ALT SERPL-CCNC: 86 U/L (ref 12–78)
AST SERPL-CCNC: 49 U/L (ref 15–37)
BASOPHILS # BLD AUTO: 0 TH/MM3 (ref 0–0.2)
BASOPHILS NFR BLD: 0.4 % (ref 0–2)
BILIRUB SERPL-MCNC: 0.3 MG/DL (ref 0.2–1)
BUN SERPL-MCNC: 7 MG/DL (ref 7–18)
CALCIUM SERPL-MCNC: 8.5 MG/DL (ref 8.5–10.1)
CHLORIDE SERPL-SCNC: 101 MEQ/L (ref 98–107)
CREAT SERPL-MCNC: 1.12 MG/DL (ref 0.6–1.3)
EOSINOPHIL # BLD: 0.1 TH/MM3 (ref 0–0.4)
EOSINOPHIL NFR BLD: 1.2 % (ref 0–4)
ERYTHROCYTE [DISTWIDTH] IN BLOOD BY AUTOMATED COUNT: 13.1 % (ref 11.6–17.2)
GFR SERPLBLD BASED ON 1.73 SQ M-ARVRAT: 73 ML/MIN (ref 89–?)
GLUCOSE SERPL-MCNC: 185 MG/DL (ref 74–106)
HCO3 BLD-SCNC: 21.2 MEQ/L (ref 21–32)
HCT VFR BLD CALC: 39.6 % (ref 39–51)
HGB BLD-MCNC: 13.4 GM/DL (ref 13–17)
LYMPHOCYTES # BLD AUTO: 1.6 TH/MM3 (ref 1–4.8)
LYMPHOCYTES NFR BLD AUTO: 17.8 % (ref 9–44)
MCH RBC QN AUTO: 29.4 PG (ref 27–34)
MCHC RBC AUTO-ENTMCNC: 33.8 % (ref 32–36)
MCV RBC AUTO: 87.1 FL (ref 80–100)
MONOCYTE #: 1 TH/MM3 (ref 0–0.9)
MONOCYTES NFR BLD: 10.7 % (ref 0–8)
NEUTROPHILS # BLD AUTO: 6.4 TH/MM3 (ref 1.8–7.7)
NEUTROPHILS NFR BLD AUTO: 69.9 % (ref 16–70)
PLATELET # BLD: 203 TH/MM3 (ref 150–450)
PMV BLD AUTO: 10.2 FL (ref 7–11)
PROT SERPL-MCNC: 8.1 GM/DL (ref 6.4–8.2)
RBC # BLD AUTO: 4.55 MIL/MM3 (ref 4.5–5.9)
SODIUM SERPL-SCNC: 134 MEQ/L (ref 136–145)
WBC # BLD AUTO: 9.2 TH/MM3 (ref 4–11)

## 2018-06-17 PROCEDURE — 96365 THER/PROPH/DIAG IV INF INIT: CPT

## 2018-06-17 PROCEDURE — 85025 COMPLETE CBC W/AUTO DIFF WBC: CPT

## 2018-06-17 PROCEDURE — 80053 COMPREHEN METABOLIC PANEL: CPT

## 2018-06-17 PROCEDURE — 96375 TX/PRO/DX INJ NEW DRUG ADDON: CPT

## 2018-06-17 PROCEDURE — 96361 HYDRATE IV INFUSION ADD-ON: CPT

## 2018-06-17 PROCEDURE — 99285 EMERGENCY DEPT VISIT HI MDM: CPT

## 2018-06-17 PROCEDURE — 70450 CT HEAD/BRAIN W/O DYE: CPT

## 2018-06-17 PROCEDURE — 70486 CT MAXILLOFACIAL W/O DYE: CPT

## 2018-06-17 PROCEDURE — 80307 DRUG TEST PRSMV CHEM ANLYZR: CPT

## 2018-06-17 PROCEDURE — 83605 ASSAY OF LACTIC ACID: CPT

## 2018-06-17 PROCEDURE — 93005 ELECTROCARDIOGRAM TRACING: CPT

## 2018-06-17 NOTE — PD
HPI


Chief Complaint:  Seizure


Time Seen by Provider:  09:41


Travel History


International Travel<30 days:  No


Contact w/Intl Traveler<30days:  No


Traveled to known affect area:  No





History of Present Illness


HPI


The patient is a 37-year-old  male who presents to the emergency 

department via private vehicle after seizures.  The patient does have a history 

of seizure disorder and states he takes Keppra 1000 mill grams twice a day.  

The patient states his last dose of Keppra was last night.  The patient 

apparently had a seizure earlier today, may have also suffered another one 

while in the driveway at a friend's house.  The patient's friend states he 

called EMS and they arrived, however, the patient refused transport.  The 

patient is currently followed by his primary physician, is unable to see 

neurologist secondary to his insurance.  The patient states he was recently 

admitted to the [facility] for psychiatric problems related to bipolar affective 

disorder was noted to have 3 seizures prior to going to the [facility].  The patient 

denies any alcohol or drug consumption.  He does state he bit his tongue 

earlier today and does not remember having a seizure.  He denies any urinary 

incontinence.  He does complain of contusions and swelling to the right side of 

the face after the seizure.  Symptoms are moderate.





PFSH


Past Medical History


Arthritis:  Yes


Asthma:  No


Autoimmune Disease:  No


Blood Disorders:  No


Bipolar Disorder:  Yes


Anxiety:  Yes


Depression:  Yes


Heart Rhythm Problems:  No


Cancer:  No


Cardiovascular Problems:  Yes


High Cholesterol:  Yes


Chemotherapy:  No


Chest Pain:  No


Congestive Heart Failure:  No


COPD:  No


Cerebrovascular Accident:  No


Cystic Fibrosis:  Yes


Diabetes:  Yes


Diminished Hearing:  No


Endocrine:  Yes


GERD:  Yes


Genitourinary:  Yes


Headaches:  Yes


Hepatitis:  Yes (C)


Hiatal Hernia:  No


Hypertension:  Yes


Immune Disorder:  No


Implanted Vascular Access Dvce:  No


Insomnia:  Yes


Kidney Stones:  No


Musculoskeletal:  Yes


Neurologic:  Yes


Psychiatric:  Yes


Reproductive:  No


Respiratory:  No


Immunizations Current:  Yes


Migraines:  Yes


Radiation Therapy:  No


Renal Failure:  Yes


Schizophrenia:  Yes


Seizures:  Yes


Sickle Cell Disease:  No


Sleep Apnea:  Yes


Thyroid Disease:  No


Ulcer:  No





Past Surgical History


Abdominal Surgery:  No


AICD:  No


Arteriovenous Shunt:  No


Cardiac Surgery:  No


Ear Surgery:  No


Endocrine Surgery:  No


Eye Surgery:  No


Genitourinary Surgery:  No


Gynecologic Surgery:  No


Insulin Pump:  No


Joint Replacement:  No


Oral Surgery:  Yes (wisdom teeth extraction)


Pacemaker:  No


Thoracic Surgery:  No


Other Surgery:  Yes (oral sx)





Social History


Alcohol Use:  No


Tobacco Use:  Yes


Substance Use:  Yes





Allergies-Medications


(Allergen,Severity, Reaction):  


Coded Allergies:  


     haloperidol (Unverified  Allergy, Severe, JAWS LOCK, 6/17/18)


     penicillin G (Unverified  Allergy, Severe, 6/17/18)


Reported Meds & Prescriptions





Reported Meds & Active Scripts


Active


Keppra (Levetiracetam) 1,000 Mg Tab 1,000 Mg PO BID


Metformin (Metformin HCl) 500 Mg Tab 500 Mg PO BIDPC


     With meals


Lisinopril 20 Mg Tab 20 Mg PO DAILY


Reported


Doxepin (Doxepin HCl) 100 Mg Cap 100 Mg PO HS


Wellbutrin SR 12 HR (Bupropion HCl) 150 Mg Tab 300 Mg PO Q12HR


Gabapentin 300 Mg Cap 300 Mg PO TID


Seroquel (Quetiapine Fumarate) 400 Mg Tab 400 Mg PO BID








Review of Systems


Except as stated in HPI:  all other systems reviewed are Neg


General / Constitutional:  No: Fever


HENT:  Positive: Headaches, Other (Facial pain, bite to the tongue), No: Neck 

Pain


Cardiovascular:  No: Chest Pain or Discomfort


Respiratory:  No: Shortness of Breath


Gastrointestinal:  No: Nausea, Vomiting, Abdominal Pain


Musculoskeletal:  No: Weakness


Neurologic:  Positive: Seizures, No: Focal Abnormalities, Headache, Change in 

Mentation, Paresthesia, Sensory Disturbance





Physical Exam


Narrative


GENERAL: Awake, alert, nontoxic-appearing 37-year-old male who appears his 

stated age and is in no acute respiratory distress.


SKIN: Focused skin assessment warm/dry.  Superficial abrasion and scratch to 

the right maxilla.


HEAD: Mild right facial swelling of the right maxilla with a superficial 

abrasion and scratch to the affected area.. 


EYES: Pupils equal and round.  4 mm bilateral and reactive.


ENT: Dry blood at the right nare.  Superficial bite to the left aspect of the 

tongue.


NECK: Trachea midline. No JVD.  No tenderness of the cervical vertebrae.


CARDIOVASCULAR: Regular, tachycardic with a heart rate of 100.


RESPIRATORY: No accessory muscle use. Clear to auscultation. Breath sounds 

equal bilaterally. 


GASTROINTESTINAL: Abdomen soft, non-tender, nondistended.  No rebound 

tenderness.


MUSCULOSKELETAL: No obvious deformities. No clubbing.  No cyanosis.  No edema. 


Back: No tenderness over the thoracic or lumbar vertebrae.


NEUROLOGICAL: Awake and alert. No obvious cranial nerve deficits.  Motor 

grossly within normal limits. Normal speech.  Nonfocal.  Follows commands.  

Oriented 4.


PSYCHIATRIC: Appropriate mood and affect; insight and judgment normal.





Data


Data


Last Documented VS





Vital Signs








  Date Time  Temp Pulse Resp B/P (MAP) Pulse Ox O2 Delivery O2 Flow Rate FiO2


 


6/17/18 09:55     96 Room Air  


 


6/17/18 09:37 98.2 114 22 147/86 (106)    








Orders





 Orders


Complete Blood Count With Diff (6/17/18 09:47)


Alcohol (Ethanol) (6/17/18 09:47)


Drug Screen, Random Urine (6/17/18 09:47)


Electrocardiogram (6/17/18 )


Ct Brain W/O Iv Contrast(Rout) (6/17/18 )


Blood Glucose (6/17/18 09:47)


Ecg Monitoring (6/17/18 09:47)


Iv Access Insert/Monitor (6/17/18 09:47)


Oximetry (6/17/18 09:47)


Comprehensive Metabolic Panel (6/17/18 09:47)


Sodium Chlor 0.9% 1000 Ml Inj (Ns 1000 M (6/17/18 09:47)


Sodium Chloride 0.9% Flush (Ns Flush) (6/17/18 10:00)


Ct Facial Bones W/O Iv Cont (6/17/18 )


Lactic Acid (6/17/18 09:47)


Levetiracetam Inj (Keppra Inj) (6/17/18 10:00)


Sodium Chlor 0.9% 1000 Ml Inj (Ns 1000 M (6/17/18 10:45)


Ketorolac Inj (Toradol Inj) (6/17/18 11:45)


Morphine Inj (Morphine Inj) (6/17/18 12:30)


Ed Discharge Order (6/17/18 12:17)





Labs





Laboratory Tests








Test


  6/17/18


09:45 6/17/18


10:00 6/17/18


11:00


 


White Blood Count 9.2 TH/MM3   


 


Red Blood Count 4.55 MIL/MM3   


 


Hemoglobin 13.4 GM/DL   


 


Hematocrit 39.6 %   


 


Mean Corpuscular Volume 87.1 FL   


 


Mean Corpuscular Hemoglobin 29.4 PG   


 


Mean Corpuscular Hemoglobin


Concent 33.8 % 


  


  


 


 


Red Cell Distribution Width 13.1 %   


 


Platelet Count 203 TH/MM3   


 


Mean Platelet Volume 10.2 FL   


 


Neutrophils (%) (Auto) 69.9 %   


 


Lymphocytes (%) (Auto) 17.8 %   


 


Monocytes (%) (Auto) 10.7 %   


 


Eosinophils (%) (Auto) 1.2 %   


 


Basophils (%) (Auto) 0.4 %   


 


Neutrophils # (Auto) 6.4 TH/MM3   


 


Lymphocytes # (Auto) 1.6 TH/MM3   


 


Monocytes # (Auto) 1.0 TH/MM3   


 


Eosinophils # (Auto) 0.1 TH/MM3   


 


Basophils # (Auto) 0.0 TH/MM3   


 


CBC Comment DIFF FINAL   


 


Differential Comment    


 


Lactic Acid Level  5.7 mmol/L  


 


Blood Urea Nitrogen   7 MG/DL 


 


Creatinine   1.12 MG/DL 


 


Random Glucose   185 MG/DL 


 


Total Protein   8.1 GM/DL 


 


Albumin   3.5 GM/DL 


 


Calcium Level   8.5 MG/DL 


 


Alkaline Phosphatase   76 U/L 


 


Aspartate Amino Transf


(AST/SGOT) 


  


  49 U/L 


 


 


Alanine Aminotransferase


(ALT/SGPT) 


  


  86 U/L 


 


 


Total Bilirubin   0.3 MG/DL 


 


Sodium Level   134 MEQ/L 


 


Potassium Level   4.1 MEQ/L 


 


Chloride Level   101 MEQ/L 


 


Carbon Dioxide Level   21.2 MEQ/L 


 


Anion Gap   12 MEQ/L 


 


Estimat Glomerular Filtration


Rate 


  


  73 ML/MIN 


 


 


Ethyl Alcohol Level


  


  


  LESS THAN 3


MG/DL











MDM


Medical Decision Making


Medical Screen Exam Complete:  Yes


Emergency Medical Condition:  Yes


Medical Record Reviewed:  Yes


Interpretation(s)


EKG reveals sinus tachycardia with a heart rate 104.  Q waves noted in lead V1 

and V2.


Differential Diagnosis


Differential diagnosis includes seizure disorder, breakthrough seizure, status 

epilepticus, subtherapeutic Keppra level, hyponatremia, hypoglycemia, lactic 

acidosis, facial fracture, intracranial hemorrhage.


Narrative Course


IV was established, labs are drawn and sent, and the patient was placed on 

cardiac telemetry monitoring and continuous pulse oximetry monitoring.  CT the 

brain and facial bones was obtained.  The patient was administered 1 L of IV 

fluids and Keppra 1 g intravenously.  CT the brain and facial bones is 

unremarkable.  Laboratory evaluation does reveal elevated lactic acid 5.7, most 

likely secondary to seizure.  The patient received Keppra and 2 L of IV fluid.  

After CT was negative, patient complained of headache and neck pain, therefore, 

was administered Toradol.  The patient requested a stronger medication, 

therefore, was administered morphine.  The patient has a breakthrough seizure, 

he is advised to follow-up with a neurologist.  He is advised to continue his 

previous medications and return if symptoms worsen or progress.





Diagnosis





 Primary Impression:  


 Breakthrough seizure


 Additional Impression:  


 Facial contusion


 Qualified Codes:  S00.83XA - Contusion of other part of head, initial encounter


Patient Instructions:  General Instructions





***Additional Instructions:  


Wound care instructions and facial abrasion.  Peridex and Norco as needed for 

pain and swish and spit for tongue laceration.  Continue Keppra.  Follow-up 

with a neurologist.  Please provide the patient a copy of his CT results and 

lab results at discharge.


***Med/Other Pt SpecificInfo:  Prescription(s) given


Scripts


Hydrocodone-Acetaminophen (Norco) 5 Mg-325 Mg Tab


1 TAB PO Q6H Y for PAIN, #10 TAB 0 Refills


   Prov: Douglas Delaney MD         6/17/18 


Chlorhexidine Gluconate (Mouth) Liq (Peridex Liq) 0.12% Soln


15 ML SWISH-SPIT BID, #473 ML 0 Refills


   Prov: Douglas Delaney MD         6/17/18


Disposition:  01 DISCHARGE HOME


Condition:  Stable











Douglas Delaney MD Jun 17, 2018 09:56

## 2018-06-17 NOTE — RADRPT
EXAM DATE:  6/17/2018 10:35 AM EDT

AGE/SEX:        37 years / Male



INDICATIONS:  Head pain due to trauma.



CLINICAL DATA:  This is the patient's initial encounter. Patient reports that signs and symptoms have
 been present for 1 day and indicates a pain score of 3/10. 

                                                                          

MEDICAL/SURGICAL HISTORY:   Hypertension.  Diabetes mellitus type II.  Cardiovascular disease.  Seizu
re, Renal failure.  None.



RADIATION DOSE:  45.79 CTDI (mGy)







[place]      [facility] [place]  [facility] [place]17/2016.  . 







TECHNIQUE:  CT of the head without contrast.  Using automated exposure control and adjustment of the 
mA and/or kV according to patient size, radiation dose was kept as low as reasonably achievable to ob
tain optimal diagnostic quality images.



FINDINGS: 

Cerebrum:  The ventricles are normal. There is a stable area of low density in the right frontal lobe
 convexity, likely representing encephalomalacia. No midline shift, mass lesion, hemorrhage or acute 
infarction.  No extraaxial fluid collections are seen.

Posterior Fossa:  The cerebellum and brainstem demonstrate no acute abnormality. The 4th ventricle is
 midline.  The cerebellopontine angle is within normal limits. 

Extracranial:  The visualized sinuses are clear. 

Skull:  The calvaria is intact.  No skull fracture.







CONCLUSION:

1.  Stable noncontrast head CT. No acute finding is identified.

2.  Stable encephalomalacia in the right frontal lobe.



Electronically signed by: José Luis Cai MD  6/17/2018 10:39 AM EDT

## 2018-06-17 NOTE — RADRPT
EXAM DATE:  6/17/2018 10:36 AM EDT

AGE/SEX:        37 years / Male



INDICATIONS:  Facial pain due to trauma.



CLINICAL DATA:  This is the patient's initial encounter. Patient reports that signs and symptoms have
 been present for 1 day and indicates a pain score of 3/10. 

                                                                          

MEDICAL/SURGICAL HISTORY:       Diabetes mellitus type II.  Cardiovascular disease.  Hypertension.  S
eizure.  None.



RADIATION DOSE:  36.02 CTDI (mGy)







COMPARISON:      No prior exams available for comparison. 





TECHNIQUE:  Contiguous images in the axial and coronal planes were obtained using helical multirow de
tector technique.  Using automated exposure control and adjustment of the mA and/or kV according to p
atient size, radiation dose was kept as low as reasonably achievable to obtain optimal diagnostic varinder
lity images.



FINDINGS: 

Orbits:  No fracture.  The retroconal structures have a normal configuration.  No radiopaque foreign 
bodies are seen.

Nasal Bones:  The nasal bones and maxillary spine are intact.

Zygomatic Arches:  Symmetric without fracture.

Sinuses:  The maxillary, ethmoid, and frontal sinuses are intact.  No air-fluid levels seen.

Nasal Cavity:  The nasal septum is intact and midline.

Soft Tissues:  No radiopaque foreign bodies seen. There is focal swelling and subcutaneous edema in t
he right premaxillary region.

Other: The mandible and pterygoid plates are intact. Visualized intracranial structures demonstrate n
o acute abnormality.   There is a stable area of encephalomalacia in the right frontal lobe.



 

CONCLUSION:

1.  No maxillofacial fracture is identified.

2.  There is right facial subcutaneous edema and swelling.



Electronically signed by: José Luis Cai MD  6/17/2018 10:42 AM EDT

## 2018-06-18 NOTE — EKG
Date Performed: 06/17/2018       Time Performed: 10:01:41

 

PTAGE:      37 years

 

EKG:      SINUS TACHYCARDIA SEPTAL MYOCARDIAL INFARCTION ABNORMAL ECG

 

PREVIOUS TRACING       : 08/15/2017 11.40

 

DOCTOR:   Adan Lopez  Interpretating Date/Time  06/18/2018 23:06:03

## 2018-09-14 ENCOUNTER — APPOINTMENT (RX ONLY)
Dept: URBAN - METROPOLITAN AREA CLINIC 52 | Facility: CLINIC | Age: 38
Setting detail: DERMATOLOGY
End: 2018-09-14

## 2018-09-14 DIAGNOSIS — L81.0 POSTINFLAMMATORY HYPERPIGMENTATION: ICD-10-CM

## 2018-09-14 DIAGNOSIS — L40.0 PSORIASIS VULGARIS: ICD-10-CM

## 2018-09-14 PROCEDURE — 96372 THER/PROPH/DIAG INJ SC/IM: CPT

## 2018-09-14 PROCEDURE — 99202 OFFICE O/P NEW SF 15 MIN: CPT | Mod: 25

## 2018-09-14 PROCEDURE — ? INTRAMUSCULAR KENALOG

## 2018-09-14 PROCEDURE — ? COUNSELING

## 2018-09-14 PROCEDURE — ? ORDER TESTS

## 2018-09-14 PROCEDURE — ? PRESCRIPTION

## 2018-09-14 RX ORDER — CRISABOROLE 20 MG/G
1 OINTMENT TOPICAL BID
Qty: 1 | Refills: 3 | Status: ERX | COMMUNITY
Start: 2018-09-14

## 2018-09-14 RX ADMIN — CRISABOROLE 1: 20 OINTMENT TOPICAL at 00:00

## 2018-09-14 ASSESSMENT — LOCATION DETAILED DESCRIPTION DERM
LOCATION DETAILED: RIGHT BUTTOCK
LOCATION DETAILED: RIGHT INFERIOR CENTRAL MALAR CHEEK
LOCATION DETAILED: RIGHT SUPERIOR LATERAL BUCCAL CHEEK
LOCATION DETAILED: PERIUMBILICAL SKIN

## 2018-09-14 ASSESSMENT — LOCATION ZONE DERM
LOCATION ZONE: TRUNK
LOCATION ZONE: FACE

## 2018-09-14 ASSESSMENT — LOCATION SIMPLE DESCRIPTION DERM
LOCATION SIMPLE: RIGHT CHEEK
LOCATION SIMPLE: RIGHT BUTTOCK
LOCATION SIMPLE: ABDOMEN

## 2018-09-14 NOTE — PROCEDURE: COUNSELING
Patient Specific Counseling (Will Not Stick From Patient To Patient): Gave patient a hand written bloodwork order for [facility]. Depending on results and patient’s insurance , may start patient on Humira.
Detail Level: Generalized
Detail Level: Zone

## 2018-12-13 ENCOUNTER — APPOINTMENT (RX ONLY)
Dept: URBAN - METROPOLITAN AREA CLINIC 61 | Facility: CLINIC | Age: 38
Setting detail: DERMATOLOGY
End: 2018-12-13

## 2018-12-13 DIAGNOSIS — L29.8 OTHER PRURITUS: ICD-10-CM

## 2018-12-13 DIAGNOSIS — L29.89 OTHER PRURITUS: ICD-10-CM

## 2018-12-13 DIAGNOSIS — L21.8 OTHER SEBORRHEIC DERMATITIS: ICD-10-CM

## 2018-12-13 PROCEDURE — ? COUNSELING

## 2018-12-13 PROCEDURE — ? PRESCRIPTION

## 2018-12-13 PROCEDURE — 99213 OFFICE O/P EST LOW 20 MIN: CPT

## 2018-12-13 RX ORDER — HYDROCORTISONE 25 MG/G
CREAM TOPICAL
Qty: 1 | Refills: 2 | Status: ERX | COMMUNITY
Start: 2018-12-13

## 2018-12-13 RX ADMIN — HYDROCORTISONE: 25 CREAM TOPICAL at 14:51

## 2018-12-13 ASSESSMENT — LOCATION DETAILED DESCRIPTION DERM
LOCATION DETAILED: RIGHT SUPERIOR LATERAL BUCCAL CHEEK
LOCATION DETAILED: RIGHT INFERIOR MEDIAL BUCCAL CHEEK
LOCATION DETAILED: LEFT CHIN
LOCATION DETAILED: LEFT UPPER CUTANEOUS LIP
LOCATION DETAILED: RIGHT CENTRAL MALAR CHEEK

## 2018-12-13 ASSESSMENT — LOCATION ZONE DERM
LOCATION ZONE: FACE
LOCATION ZONE: LIP

## 2018-12-13 ASSESSMENT — LOCATION SIMPLE DESCRIPTION DERM
LOCATION SIMPLE: CHIN
LOCATION SIMPLE: LEFT LIP
LOCATION SIMPLE: RIGHT CHEEK

## 2023-01-03 NOTE — PD
HPI


Chief Complaint:  Psychiatric Symptoms


Time Seen by Provider:  22:53


Travel History


International Travel<30 days:  No


Contact w/Intl Traveler<30days:  No


Traveled to known affect area:  No





History of Present Illness


HPI


37-year-old white male presents as a transfer from [facility] 

after being medically cleared for psychological evaluation by the psychiatrist.

  The patient had been placed under a Baker act for suicidal homicidal 

statements.  He states that he is disabled with diabetes, seizure disorder, 

bipolar and depression.  He had been living in a home and was evicted on the 

first of the month.  He is now living at his grandmother's house.  States that 

he has nowhere else to go.  This has made him feel increasingly depressed.  He 

states that he plans on getting a gun and shoot himself.  He is tired of 

living.  Also states that he wanted to hurt his uncle.  He complains of a 

tender area under his right axilla over the last few days.  He denies any fever 

chills.  No cough, congestion, nausea, vomiting, abdominal pain or urine 

symptoms.  He has not had any toxic ingestions.





[facility]


Past Medical History


*** Narrative Medical


Diabetes, seizure disorder, bipolar, depression


Arthritis:  Yes


Asthma:  No


Autoimmune Disease:  No


Blood Disorders:  No


Bipolar Disorder:  Yes


Anxiety:  Yes


Depression:  Yes


Heart Rhythm Problems:  No


Cancer:  No


Cardiovascular Problems:  Yes


High Cholesterol:  Yes


Chemotherapy:  No


Chest Pain:  No


Congestive Heart Failure:  No


COPD:  No


Cerebrovascular Accident:  No


Cystic Fibrosis:  Yes


Diabetes:  Yes


Diminished Hearing:  No


Endocrine:  Yes


GERD:  Yes


Genitourinary:  Yes


Headaches:  Yes


Hepatitis:  Yes (C)


Hiatal Hernia:  No


Hypertension:  Yes


Immune Disorder:  No


Implanted Vascular Access Dvce:  No


Kidney Stones:  No


Musculoskeletal:  Yes


Neurologic:  Yes


Psychiatric:  Yes


Reproductive:  No


Respiratory:  No


Immunizations Current:  Yes


Migraines:  Yes


Radiation Therapy:  No


Renal Failure:  Yes


Schizophrenia:  Yes


Seizures:  Yes


Sickle Cell Disease:  No


Sleep Apnea:  Yes


Thyroid Disease:  No


Ulcer:  No





Past Surgical History


Abdominal Surgery:  No


AICD:  No


Arteriovenous Shunt:  No


Cardiac Surgery:  No


Ear Surgery:  No


Endocrine Surgery:  No


Eye Surgery:  No


Genitourinary Surgery:  No


Gynecologic Surgery:  No


Insulin Pump:  No


Joint Replacement:  No


Oral Surgery:  Yes (wisdom teeth extraction)


Pacemaker:  No


Thoracic Surgery:  No


Other Surgery:  Yes (oral sx)





Social History


Alcohol Use:  Yes (occasionally)


Tobacco Use:  Yes (HALF PACK A DAY )


Substance Use:  Yes





Allergies-Medications


(Allergen,Severity, Reaction):  


Coded Allergies:  


     haloperidol (Unverified  Allergy, Severe, JAWS LOCK, 5/21/18)


     penicillin G (Unverified  Allergy, Severe, 5/21/18)


Reported Meds & Prescriptions





Reported Meds & Active Scripts


Active


Keppra (Levetiracetam) 1,000 Mg Tab 1,000 Mg PO BID


Metformin (Metformin HCl) 500 Mg Tab 500 Mg PO BIDPC


     With meals


Glipizide ER (Glipizide) 2.5 Mg Umang 2.5 Mg PO DAILY


     Take with breakfast or first main meal of the day


Blood Glucose Monitor (Blood-Glucose Meter) 1 Each Kit Kit  TIDAC


Calcipotriene Topical (Calcipotriene) 0.005% Oint 1 Applic TOPICAL BID


Lisinopril 20 Mg Tab 20 Mg PO DAILY


Betamethasone Dipropionate Aug Topical 0.05% Cream 1 Applic TOPICAL BID


Heartburn Relief 150 Maxi (Ranitidine HCl) 150 Mg Tab 150 Mg PO DAILY


Reported


Prozac (Fluoxetine HCl) 40 Mg Cap 60 Mg PO DAILY








Review of Systems


General / Constitutional:  No: Fever


Eyes:  No: Visual changes


HENT:  No: Headaches


Cardiovascular:  No: Chest Pain or Discomfort


Respiratory:  No: Shortness of Breath


Gastrointestinal:  No: Nausea, Vomiting, Diarrhea, Abdominal Pain


Genitourinary:  No: Dysuria


Musculoskeletal:  No: Pain


Skin:  Positive Rash, Positive Lumps


Neurologic:  No: Weakness


Psychiatric:  Positive: Depression, Suicidal Ideations, Mood Disorder, 

Substance Abuse, Homicidal Ideation, No: Anxiety, Disorder of Thought


Endocrine:  No: Polydipsia


Hematologic/Lymphatic:  No: Easy Bruising





Physical Exam


Narrative


GENERAL: Well-nourished, well-developed patient.


SKIN: Warm and dry.  Patient has a 1.5 x 1.5 cm area of erythema and induration 

to the right axilla consistent with an abscess.  No fluctuance or pointing.


HEAD: Normocephalic and atraumatic.


EYES: No scleral icterus. No injection or drainage. 


ENT: No nasal drainage noted. Mucous membranes pink. Airway patent.


NECK: Supple, trachea midline.  Moves head freely without obvious discomfort.


CARDIOVASCULAR: Regular rate and rhythm without murmurs, gallops, or rubs. 


RESPIRATORY: Breath sounds equal bilaterally. No accessory muscle use.


GASTROINTESTINAL: Abdomen soft, non-tender, nondistended. 


EXTREMITIES: No cyanosis or edema.


BACK: Nontender without obvious deformity. No CVA tenderness.


NEURO: Patient is alert and oriented. no sensorimotor deficits.  Nonfocal.  

Normal speech.


PSYCH: No delusions.  No auditory or visual hallucinations.





MDM


Medical Decision Making


Medical Screen Exam Complete:  Yes


Emergency Medical Condition:  Yes


Medical Record Reviewed:  Yes


Interpretation(s)


I reviewed the patient's laboratory tests.


Differential Diagnosis


MDM: High


Differential diagnoses: Schizophrenia, schizoaffective disorder, bipolar, 

anxiety, depression, adjustment reaction, mood disorder NOS, ODD, depressive 

disorder NOS,  psychosis NOS, substance induced mood disorder, infection,

electrolyte abnormality, malingering.


Narrative Course


Mental health screening discussed with the patient.  Psychiatric screen ordered.





The patient will be started on Bactrim DS here in the ER and a prescription 

will be written.  I agree with the patient's medical clearance.





This is medical clearance for psychiatric admission, right axilla abscess





Diagnosis





 Primary Impression:  


 Medical clearance for psychiatric admission


 Additional Impression:  


 Right axilla abscess


***Med/Other Pt SpecificInfo:  Prescription(s) given


Condition:  Stable











Hussain Huizar May 21, 2018 23:25 Talk to Dr Fabio Homans about sleep study